# Patient Record
Sex: FEMALE | Race: WHITE | NOT HISPANIC OR LATINO | Employment: STUDENT | URBAN - METROPOLITAN AREA
[De-identification: names, ages, dates, MRNs, and addresses within clinical notes are randomized per-mention and may not be internally consistent; named-entity substitution may affect disease eponyms.]

---

## 2021-11-22 ENCOUNTER — HOSPITAL ENCOUNTER (OUTPATIENT)
Dept: BEHAVIORAL HEALTH | Facility: CLINIC | Age: 21
Discharge: HOME | End: 2021-11-22
Attending: PSYCHIATRY & NEUROLOGY | Admitting: PSYCHIATRY & NEUROLOGY
Payer: COMMERCIAL

## 2021-11-22 PROCEDURE — 90791 PSYCH DIAGNOSTIC EVALUATION: CPT | Mod: GT,95 | Performed by: PSYCHOLOGIST

## 2021-11-22 ASSESSMENT — ANXIETY QUESTIONNAIRES
GAD7 TOTAL SCORE: 18
5. BEING SO RESTLESS THAT IT IS HARD TO SIT STILL: NEARLY EVERY DAY
8. IF YOU CHECKED OFF ANY PROBLEMS, HOW DIFFICULT HAVE THESE MADE IT FOR YOU TO DO YOUR WORK, TAKE CARE OF THINGS AT HOME, OR GET ALONG WITH OTHER PEOPLE?: VERY DIFFICULT
7. FEELING AFRAID AS IF SOMETHING AWFUL MIGHT HAPPEN: MORE THAN HALF THE DAYS
6. BECOMING EASILY ANNOYED OR IRRITABLE: SEVERAL DAYS
1. FEELING NERVOUS, ANXIOUS, OR ON EDGE: NEARLY EVERY DAY
GAD7 TOTAL SCORE: 18
7. FEELING AFRAID AS IF SOMETHING AWFUL MIGHT HAPPEN: MORE THAN HALF THE DAYS
3. WORRYING TOO MUCH ABOUT DIFFERENT THINGS: NEARLY EVERY DAY
GAD7 TOTAL SCORE: 18
4. TROUBLE RELAXING: NEARLY EVERY DAY
2. NOT BEING ABLE TO STOP OR CONTROL WORRYING: NEARLY EVERY DAY

## 2021-11-22 ASSESSMENT — PATIENT HEALTH QUESTIONNAIRE - PHQ9
SUM OF ALL RESPONSES TO PHQ QUESTIONS 1-9: 20
SUM OF ALL RESPONSES TO PHQ QUESTIONS 1-9: 20
10. IF YOU CHECKED OFF ANY PROBLEMS, HOW DIFFICULT HAVE THESE PROBLEMS MADE IT FOR YOU TO DO YOUR WORK, TAKE CARE OF THINGS AT HOME, OR GET ALONG WITH OTHER PEOPLE: VERY DIFFICULT

## 2021-11-22 NOTE — PROGRESS NOTES
"    [facility] Addiction [facility]  Provider Name:  Urszula Mir      Credentials:  MEME TORRE    PATIENT'S NAME: Shanita Ramos  PREFERRED NAME: Shanita  PRONOUNS:   kehinde her    MRN: [id number]  : 2000  ADDRESS: [address] [place] [id number]  ACCT. NUMBER:  [id number]  DATE OF SERVICE: 21  START TIME: 1330  END TIME: 1435  PREFERRED PHONE: [phone]  May we leave a program related message: Yes  SERVICE MODALITY:  Video Visit:      Provider verified identity through the following two step process.  Patient provided:  Patient  and Patient address    Telemedicine Visit: The patient's condition can be safely assessed and treated via synchronous audio and visual telemedicine encounter.      Reason for Telemedicine Visit: Services only offered telehealth    Originating Site (Patient Location): Patient's home    Distant Site (Provider Location): [facility] MENTAL HEALTH & ADDICTION SERVICES    Consent:  The patient/guardian has verbally consented to: the potential risks and benefits of telemedicine (video visit) versus in person care; bill my insurance or make self-payment for services provided; and responsibility for payment of non-covered services.     Patient would like the video invitation sent by:  My Chart    Mode of Communication:  Video Conference via [facility]    As the provider I attest to compliance with applicable laws and regulations related to telemedicine.    UNIVERSAL ADULT Mental Health DIAGNOSTIC ASSESSMENT    Identifying Information:  Patient is a 21 year old,  female .  The pronoun use throughout this assessment reflects the patient's chosen pronoun.  Patient was referred for an assessment by community Mental Health Program.  Patient attended the session alone.    Chief Complaint:   The reason for seeking services at this time is: \"Depression and Anxiety\".  The problem(s) began 22.    Patient has attempted to " "resolve these concerns in the past through therapy and medication (only recently, one week on zoloft.    Social/Family History:  Patient reported they grew up in [place].  They were raised by biological parents.  Parents stayed ..   Patient reported that their childhood was \"I had a good childhood but when it comes to OCD, I had it a long time.  I was a very anxious child.  OCD is under control now, it was really bad middle school and high school\".  Patient described their current relationships with family of origin as \"my parents just don't really believe in mental health issues and concerns.  It was a really difficult thing to go to therapy\". (parents were not initially supportive)      Cultural influences and impact on patient's life structure, values, norms, and healthcare: none.  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    These factors will be addressed in the Preliminary Treatment plan.  Patient identified their preferred language to be English. Patient reported they do not  need the assistance of an  or other support involved in therapy.     Patient reported had no significant delays in developmental tasks.   Patient's highest education level was some college. Patient identified the following learning problems: none reported.  Modifications will not be used to assist communication in therapy.   Patient reports they are  able to understand written materials.    Patient reported the following relationship history pt has a hx of a relationship .  Patient's current relationship status is single.   Patient identified their sexual orientation as heterosexual.  Patient reported having zero child(ace). Patient identified friends and therapist as part of their support system.  Patient identified the quality of these relationships as stable and meaningful.     Patient's current living/housing situation " involves staying in own home/apartment.  They live with three roommates and they report that housing is stable.     Patient is currently a student and reports they are not able to function appropriately at school..  Patient reports their finances are obtained through parents.  Patient does not identify finances as a current stressor. She is concerned about health care expenses however.     Patient reported that they have not been involved with the legal system.   Patient denies being on probation / parole / under the jurisdiction of the court.      Patient's Strengths and Limitations:  Patient identified the following strengths or resources that will help them succeed in treatment: friends / good social support, insight, positive school connection and motivation. Things that may interfere with the patient's success in treatment include: my parents.     Personal and Family Medical History:  Patient does report a family history of mental health concerns.  Patient reports family history is not on file..     Patient does report Mental Health Diagnosis and/or Treatment.  Patient Patient reported the following previous diagnoses which include(s): an Anxiety Disorder, Depression and Obsessive Compulsive Disorder.  Patient reported symptoms began pt reports decrease in motivation since school started.  It got pretty bad since the 7th of November and persisted the next two weeks..   Patient has received mental health services in the past: therapy with Dr Millard therapist at [facility].  Psychiatric Hospitalizations: None.  Patient denies a history of civil commitment.          Patient has not had a physical exam to rule out medical causes for current symptoms.  Date of last physical exam was greater than a year ago and client was encouraged to schedule an exam with PCP. The patient has a non-[facility] Primary Care Provider. Their PCP is Shellie Pritchett ..  Shellie Pritchett APRN, C.N.P., M.S.N.    [address] [place] [id number]     Phone: [phone]    Fax: [phone]     Patient reports no current medical concerns.  Patient denies any issues with pain..   There are significant appetite / nutritional concerns / weight changes with a recent unexpected weight loss although in the past few days her appetite is restoring..   Patient does not report a history of head injury / trauma / cognitive impairment.      Patient reports current meds as:   Outpatient Medications Marked as Taking for the 11/22/21 encounter (Hospital Encounter) with Urszula Hester LICSW   Medication Sig     sertraline (ZOLOFT) 50 MG tablet Take 50 mg by mouth daily       Medication Adherence:  Patient reports taking.  taking prescribed medications as prescribed.    Patient Allergies:  No Known Allergies    Medical History:  No past medical history on file.      Current Mental Status Exam:   Appearance:  Appropriate    Eye Contact:  Good   Psychomotor:  telehealth       Gait / station:  telehealth  Attitude / Demeanor: Cooperative  Friendly  Speech      Rate / Production: Normal/ Responsive      Volume:  Normal  volume      Language:  intact  Mood:   Anxious   Affect:   Appropriate    Thought Content: Clear   Thought Process: Goal Directed  Logical       Associations: No loosening of associations  Insight:   Good   Judgment:  Intact   Orientation:  All  Attention/concentration: Good    Rating Scales:    PHQ9:    PHQ-9 SCORE 11/22/2021   PHQ-9 Total Score MyChart 20 (Severe depression)   PHQ-9 Total Score 20       GAD7:    KARY-7 SCORE 11/22/2021   Total Score 18 (severe anxiety)   Total Score 18     CGI:     First:Considering your total clinical experience with this particular patient population, how severe are the patient's symptoms at this time?: 5 (11/22/2021  2:00 PM)      Most recentNo data recorded    Substance Use:  Patient did not report a family history of substance use concerns; see medical history section for details.  Patient has not received  chemical dependency treatment in the past.  Patient has not ever been to detox.      Patient is not currently receiving any chemical dependency treatment.           Substance History of use Age of first use Date of last use     Pattern and duration of use (include amounts and frequency)   Alcohol currently use   20 10/31/21 REPORTS SUBSTANCE USE: N/A    Minimal use reported. Pt does not appear to abuse alcohol.   Cannabis   used in the past 21 08/14/21 REPORTS SUBSTANCE USE: N/A   Minimal use reported     Amphetamines   never used     REPORTS SUBSTANCE USE: N/A   Cocaine/crack    never used       REPORTS SUBSTANCE USE: N/A   Hallucinogens never used         REPORTS SUBSTANCE USE: N/A   Inhalants never used         REPORTS SUBSTANCE USE: N/A   Heroin never used         REPORTS SUBSTANCE USE: N/A   Other Opiates never used     REPORTS SUBSTANCE USE: N/A   Benzodiazepine   never used     REPORTS SUBSTANCE USE: N/A   Barbiturates never used     REPORTS SUBSTANCE USE: N/A   Over the counter meds used in the past 18 10/09/21 Only used for cold symptoms   Caffeine currently use 9   REPORTS SUBSTANCE USE: N/A   Nicotine  never used     REPORTS SUBSTANCE USE: N/A   Other substances not listed above:  Identify:  never used     REPORTS SUBSTANCE USE: N/A     Patient reported the following problems as a result of their substance use: no problems, not applicable.     CAGE- AID:    CAGE-AID Total Score 11/22/2021   Total Score 0   Total Score MyChart 0 (A total score of 2 or greater is considered clinically significant)       Substance Use: No symptoms    Based on the negative CAGE score and clinical interview there  are not indications of drug or alcohol abuse.      Significant Losses / Trauma / Abuse / Neglect Issues:   Patient did not serve in the .  There are indications or report of significant loss, trauma, abuse or neglect issues related to: death of a friend to suicide two years ago.  Concerns for possible neglect  none    Safety Assessment:   Current Safety Concerns:  [place] Suicide Severity Rating Scale (Short Version)  [place] Suicide Severity Rating (Short Version) 11/22/2021   Over the past 2 weeks have you felt down, depressed, or hopeless? yes   Over the past 2 weeks have you had thoughts of killing yourself? yes   Comments last thought was over a week ago   Have you ever attempted to kill yourself? no     Patient denies current homicidal ideation and behaviors.  Patient denies current self-injurious ideation and behaviors.    Patient denied risk behaviors associated with substance use.  Patient denies any high risk behaviors associated with mental health symptoms.  Patient reports the following current concerns for their personal safety: None.  Patient reports there are not firearms in the house.       none.    History of Safety Concerns:  Patient denied a history of homicidal ideation.     Patient denied a history of personal safety concerns.    Patient denied a history of assaultive behaviors.    Patient denied a history of sexual assault behaviors.     Patient denied a history of risk behaviors associated with substance use.  Patient denies any history of high risk behaviors associated with mental health symptoms.  Patient reports the following protective factors: dedication to family or friends; help seeking behaviors when distressed; living with other people; sense of meaning; healthy fear of risky behaviors or pain    Risk Plan:  See Recommendations for Safety and Risk Management Plan    Review of Symptoms per patient report:  Depression: Change in sleep, Lack of interest, Excessive or inappropriate guilt, Change in energy level, Difficulties concentrating, Change in appetite, Feelings of hopelessness, Feelings of helplessness, Low self-worth, Feeling sad, down, or depressed, Withdrawn, Poor hygeine and Frequent crying  Solange:  No Symptoms  Psychosis: No Symptoms  Anxiety: Excessive worry, Nervousness,  Physical complaints, such as headaches, stomachaches, muscle tension, Sleep disturbance and Poor concentration  Panic:  No symptoms  Post Traumatic Stress Disorder:  lost a friend to suicide two years ago   Eating Disorder: No Symptoms  ADD / ADHD:  No symptoms  Conduct Disorder: No symptoms  Autism Spectrum Disorder: No symptoms  Obsessive Compulsive Disorder: Checking, Counting, Symetry, Obsessions and organizing objects, intrusive thoughts     Patient reports the following compulsive behaviors and treatment history: has had treatment for OCD and this is stable .      Diagnostic Criteria:   Generalized Anxiety Disorder  A. Excessive anxiety and worry about a number of events or activities (such as work or school performance).   B. The person finds it difficult to control the worry.  C. Select 3 or more symptoms (required for diagnosis). Only one item is required in children.   - Being easily fatigued.    - Difficulty concentrating or mind going blank.    - Sleep disturbance (difficulty falling or staying asleep, or restless unsatisfying sleep).   D. The focus of the anxiety and worry is not confined to features of an Axis I disorder.  E. The anxiety, worry, or physical symptoms cause clinically significant distress or impairment in social, occupational, or other important areas of functioning.   F. The disturbance is not due to the direct physiological effects of a substance (e.g., a drug of abuse, a medication) or a general medical condition (e.g., hyperthyroidism) and does not occur exclusively during a Mood Disorder, a Psychotic Disorder, or a Pervasive Developmental Disorder. Major Depressive Disorder  CRITERIA (A-C) REPRESENT A MAJOR DEPRESSIVE EPISODE - SELECT THESE CRITERIA  A) Single episode - symptoms have been present during the same 2-week period and represent a change from previous functioning 5 or more symptoms (required for diagnosis)   - Depressed mood. Note: In children and adolescents, can be  irritable mood.     - Diminished interest or pleasure in all, or almost all, activities.    - Significant weight gaindecrease in appetite.    - Fatigue or loss of energy.    - Feelings of worthlessness or inappropriate and excessive guilt.    - Diminished ability to think or concentrate, or indecisiveness.    - Recurrent thoughts of death (not just fear of dying), recurrent suicidal ideation without a specific plan, or a suicide attempt or a specific plan for committing suicide.   B) The symptoms cause clinically significant distress or impairment in social, occupational, or other important areas of functioning  C) The episode is not attributable to the physiological effects of a substance or to another medical condition  D) The occurence of major depressive episode is not better explained by other thought / psychotic disorders  E) There has never been a manic episode or hypomanic episode    Functional Status:  Patient reports the following functional impairments: academic performance, management of the household and or completion of tasks, self-care and social interactions.     WHODAS:   WHODAS 2.0 Total Score 11/22/2021   Total Score 34   Total Score MyChart 34     Programmatic care:  Current LOCUS was assessed and patient needs the following level of care based on score 18  .    Clinical Summary:  1. Reason for assessment: pt was referred by Sanjana for increased mental health services  .  2. Psychosocial, Cultural and Contextual Factors: pt reports she is a college student, and lives in an apartment with three roommates; doesn't seem to get connect with several of the roommates and struggles with her major in college.  Her parents are also critical of her choice of studies.  Parents do not support mental health treatment as a rule.   .  3. Principal DSM5 Diagnoses  (Sustained by DSM5 Criteria Listed Above):   296.22 (F32.1)  Major Depressive Disorder, Single Episode, Moderate _ and With anxious distress  300.02  (F41.1) Generalized Anxiety Disorder.  4. Other Diagnoses that is relevant to services:   Hx of OCD that pt reports is stable .  5. Provisional Diagnosis:  none  6. Prognosis: Return to Normal Functioning.  7. Likely consequences of symptoms if not treated: school performance issues and worsening of mood.  8. Client strengths include:  caring, educated, goal-focused, good listener, has a previous history of therapy, insightful, intelligent, motivated, open to learning, open to suggestions / feedback and willing to ask questions .     Recommendations:     1. Plan for Safety and Risk Management:   A safety and risk management plan has been developed including: Patient consented to co-developed safety plan.  Safety and risk management plan was completed.  Patient agreed to use safety plan should any safety concerns arise.  A copy was given to the patient..          Report to child / adult protection services was NA.     2. Patient's identified none.     3. Initial Treatment will focus on:    Depressed Mood - is primary  Anxiety - causing sleep impairments  Functional Impairment at: school.     4. Resources/Service Plan:    services are not indicated.   Modifications to assist communication are not indicated.   Additional disability accommodations are not indicated.      5. Collaboration:   Collaboration / coordination of treatment will be initiated with the following  support professionals: primary care physician.      6.  Referrals:   The following referral(s) will be initiated: PHP . Next Scheduled Appointment: 11/24/21.     A Release of Information has been obtained for the following: recommend SHANTELL to Shellie Pritchett APRN, C.N.P., M.S.N.  and her [facility] Therapist Dr. Millard.    7. ADDY:    ADDY:  Discussed the general effects of drugs and alcohol on health and well-being.     8. Records:   These were reviewed at time of assessment.   Information in this assessment was obtained from the medical record and  " provided by patient who is a good historian.    Patient will have open access to their mental health medical record.      Provider Name/ Credentials:   Urszula Hester MA, LP 2021                                    LOCUS Worksheet     Name: Shanita Ramos MRN: [id number]    : 2000      Gender:  female    PMI:  na   Provider Name: Urszula Hester MA, LP     Provider NPI:  [id number]    Actual level of Care Provided:  Assessment and Referral    Service(s) receiving or referred to:  PHP    Reason for Variance: NA      Rating completed by: Urszula Garciaignacio VALENTINE LP      I. Risk of Harm:   3      Moderate Risk of Harm    II. Functional Status:   4      Serious Impairment    III. Co-Morbidity:   2      Minor Co-Morbidity    IV - A. Recovery Environment - Level of Stress:   3      Moderately Stress Environment    IV - B. Recovery Environment - Level of Support:   3      Limited Support in Environment    V. Treatment and Recovery History:   2      Significant Response to Treatment and Recovery Management    VI. Engagement and Recovery Project:   1      Optimal Engagement and Recovery       18 Composite Score    Level of Care Recommendation:   17 to 19       High Intensity Community Based Services                  Outpatient Mental Health Services - Adult    MY COPING PLAN FOR SAFETY    PATIENT'S NAME: Shanita Ramos  MRN:   [id number]    SAFETY PLAN:    Step 1: Warning signs / cues (Thoughts, images, mood, situation, behavior) that a crisis may be developing:      Thoughts: \"I can't do this anymore\", \"Nothing makes it better\" and even if I feel good for a month, it kind of goes back    Images: none    Thinking Processes: ruminations (can't stop thinking about my problems): alot of rumination, it spirals    Mood: worsening depression and I'm usually energetic but I become apathetic    Behaviors: can't stop crying, not taking care of my responsibilities and I stop caring about my " schoolwork.  Things get more messy    Situations: anniversary of friends suicide and That I don't like my classes       Step 2: Coping strategies - Things I can do to take my mind off of my problems without contacting another person (relaxation technique, physical activity):      Distress Tolerance Strategies:  listen to positive and upbeat music: I like to paint., watch a funny movie: yes    Physical Activities: i walk to class everyday    Focus on helpful thoughts:  this has definitely been hard in the last few weeks.  I just think about my friend at [place]    Step 3: People and social settings that provide distraction:  Name: my friend Speedy Phone: [phone]   Name: my friend Rebekah Phone: [phone]      going out near people, going to a restaurant     Step 4: Remind myself of people and things that are important to me and worth living for:  My friend Rebekah. My sister.      Step 5: When I am in crisis, I can ask these people to help me use my safety plan:     Name: my friend Speedy Phone: [phone]   Name: my friend Rebekah Phone: [phone]    Step 6: Making the environment safe:       I live with other people, three roommates    Step 7: Professionals or agencies I can contact during a crisis:      Suicide Prevention Lifeline: 7-629-480-TALK (4586)    Crisis Text Line Service (available 24 hours a day, 7 days a week): Text [place] to 719591    Call  **CRISIS (923922) from a cell phone to talk to a team of professionals who can help you.    Crisis Services By [facility]: Phone Number:   [place]     [phone]   [place]    [phone]   [place]    [phone]   [place]    [phone]   [place]    [phone]   [place] [phone]   [place]     [phone]       Call 911 or go to my nearest emergency department.     I helped develop this safety plan and agree to use it when needed.  I have been given a copy of this plan.      Client signature  _________________________________________________________________  Today s date:  11/22/2021  Adapted from Safety Plan Template 2008 Shruthi Vasquez and Momo Bowden is reprinted with the express permission of the authors.  No portion of the Safety Plan Template may be reproduced without the express, written permission.  You can contact the authors at bhs@[email or web address] or [email or web address]    Answers for HPI/ROS submitted by the patient on 11/22/2021  If you checked off any problems, how difficult have these problems made it for you to do your work, take care of things at home, or get along with other people?: Very difficult  PHQ9 TOTAL SCORE: 20  KARY 7 TOTAL SCORE: 18

## 2021-11-23 ENCOUNTER — TELEPHONE (OUTPATIENT)
Dept: BEHAVIORAL HEALTH | Facility: CLINIC | Age: 21
End: 2021-11-23
Payer: COMMERCIAL

## 2021-11-23 ASSESSMENT — PATIENT HEALTH QUESTIONNAIRE - PHQ9: SUM OF ALL RESPONSES TO PHQ QUESTIONS 1-9: 20

## 2021-11-23 ASSESSMENT — ANXIETY QUESTIONNAIRES: GAD7 TOTAL SCORE: 18

## 2021-11-23 NOTE — TELEPHONE ENCOUNTER
Pt states she was not aware that she would start program tomorrow.  She needs time to clear with her professors to get time off.  She understands that admittance to program will be upon availability of open spots.  She will contact  when she is ready, and proceed as  Indicated.

## 2021-12-12 ENCOUNTER — HEALTH MAINTENANCE LETTER (OUTPATIENT)
Age: 21
End: 2021-12-12

## 2022-01-06 ENCOUNTER — TELEPHONE (OUTPATIENT)
Dept: BEHAVIORAL HEALTH | Facility: CLINIC | Age: 22
End: 2022-01-06
Payer: COMMERCIAL

## 2022-01-06 NOTE — TELEPHONE ENCOUNTER
Writer received voicemail from patient stating she is ready to start in outpatient program. Writer returned call from patient and explained that since it has been approx 6 weeks since we have heard from her about starting that she would need to contact the [facility] Assessment Center to discuss entering into programmatic care. Patient agreed to call assessment center and stated she had the contact information.     Tigist Perry, [facility]  1/6/2022

## 2022-10-03 ENCOUNTER — HEALTH MAINTENANCE LETTER (OUTPATIENT)
Age: 22
End: 2022-10-03

## 2023-02-11 ENCOUNTER — HEALTH MAINTENANCE LETTER (OUTPATIENT)
Age: 23
End: 2023-02-11

## 2024-03-09 ENCOUNTER — HEALTH MAINTENANCE LETTER (OUTPATIENT)
Age: 24
End: 2024-03-09